# Patient Record
Sex: FEMALE | Race: WHITE
[De-identification: names, ages, dates, MRNs, and addresses within clinical notes are randomized per-mention and may not be internally consistent; named-entity substitution may affect disease eponyms.]

---

## 2017-05-16 ENCOUNTER — HOSPITAL ENCOUNTER (OUTPATIENT)
Dept: HOSPITAL 80 - FSGY | Age: 57
Discharge: HOME HEALTH | End: 2017-05-16
Attending: INTERNAL MEDICINE
Payer: MEDICAID

## 2017-05-16 DIAGNOSIS — K63.5: Primary | ICD-10-CM

## 2017-05-16 DIAGNOSIS — K64.4: ICD-10-CM

## 2017-05-16 DIAGNOSIS — K64.8: ICD-10-CM

## 2017-05-16 NOTE — GPN
[f rep st]



                                                                 PROCEDURE NOTE





PREPROCEDURE DIAGNOSES:  

1.  Heartburn.

2.  Average risk screening colonoscopy.



POSTPROCEDURE DIAGNOSES:  

1.  EGD with biopsy.

2.  Colonoscopy with snare and injection.



ANESTHESIA:  Monitored anesthesia care.



INDICATIONS:  The patient is a 57-year-old female with a history of longstanding heartburn who recen
tly increased her Nexium to 20 mg orally twice per day.  Her symptoms are better controlled with thi
s increased dose of medication.  She has never had a colonoscopy.  She is at average risk for screen
ing colonoscopy.  The risks and the benefits of the procedure were discussed with the patient and co
nsent obtained.  Risks include, but not limited to, bleeding, perforation, and risks associated with
 sedation.  The patient is ASA class 2.



DESCRIPTION OF PROCEDURE:  The end-viewing endoscope was inserted into the esophagus, into the stoma
ch and second portion of the duodenum.  The esophagus appears normal.  The Z-line is irregular and l
ocated at 36 cm from the incisors.  Biopsies were taken using cold biopsy forceps to evaluate for Ba
rrett's.  The stomach appears normal.  Retroflexed views in the stomach were normal.  Biopsies were 
taken of the gastric antrum to evaluate for Helicobacter pylori using cold biopsy forceps.  The duod
enum and second portion were normal.  Biopsies were taken using cold biopsy forceps to evaluate for 
celiac disease.  



The patient was then repositioned and the adult colonoscope was advanced into the terminal ileum, wh
ich appeared normal.  The appendiceal orifice and cecum were normal.  In the ascending colon, just d
istal to the ileocecal valve, there is a 1.3 cm flat polyp.  The lesion was removed in 3 pieces usin
g piecemeal polypectomy technique and hot snare.  The polyp appeared to be completely removed.  Two 
hemoclips were placed over the defect to close the site.  A tattoo was placed at the site to lili th
e piecemeal polypectomy site.  The remaining ascending colon, hepatic flexure, transverse colon, spl
enic flexure, and descending colon were normal.  There was a 4 mm sessile polyp in the sigmoid colon
 removed using cold snare polypectomy.  The rectum was normal.  Retroflexed views showed internal he
morrhoids, grade 1.  External hemorrhoids were found on rectal exam.



IMPRESSION:  

1.  Piecemeal polypectomy of ascending colon polyp, status post hemoclip placement and tattoo.

2.  Small sigmoid colon polyp, status post cold snare polypectomy.

3.  Internal and external hemorrhoids.



RECOMMENDATIONS:  

1.  Discharged home with escort.

2.  Advance diet as tolerated.

3.  Follow up on the final pathology results.  Results available within 10 days.

4.  Repeat colonoscopy with monitored anesthesia at the hospital in 6 months given piecemeal polypec
lyndsay and possibly for argon plasma coagulation. 



Thank you for allowing me to participate in the care of your patient.  Please do not hesitate to beni hills with questions.





Job #:  [id number]

## 2018-01-15 ENCOUNTER — HOSPITAL ENCOUNTER (OUTPATIENT)
Dept: HOSPITAL 80 - FSGY | Age: 58
Discharge: HOME | End: 2018-01-15
Payer: MEDICAID

## 2018-01-15 VITALS — RESPIRATION RATE: 14 BRPM

## 2018-01-15 VITALS — DIASTOLIC BLOOD PRESSURE: 73 MMHG | SYSTOLIC BLOOD PRESSURE: 123 MMHG | HEART RATE: 66 BPM | OXYGEN SATURATION: 96 %

## 2018-01-15 VITALS — TEMPERATURE: 97.9 F

## 2018-01-15 DIAGNOSIS — D12.2: ICD-10-CM

## 2018-01-15 DIAGNOSIS — D12.5: Primary | ICD-10-CM

## 2018-01-15 PROCEDURE — 0DBN8ZX EXCISION OF SIGMOID COLON, VIA NATURAL OR ARTIFICIAL OPENING ENDOSCOPIC, DIAGNOSTIC: ICD-10-PCS

## 2018-01-15 PROCEDURE — 0DBK8ZX EXCISION OF ASCENDING COLON, VIA NATURAL OR ARTIFICIAL OPENING ENDOSCOPIC, DIAGNOSTIC: ICD-10-PCS

## 2018-01-15 NOTE — PDGENHP
History & Physical


Chief Complaint: polyp


History of Present Illness: 57 year old female presents for surveillance of a 

ascending colon polyp.


Pertinent Past, Social, Family History: PMHx: DJD, asthma, reflux


Relevant Physical Exam: HEENT: Anicteric.  CV: RRR +s1s2.  Lungs: CTAB.  Abd; 

soft, nt, + bs


Cardiorespiratory Assessment: ASA 2

## 2018-01-15 NOTE — PDANEPAE
ANE Past Medical History





- Cardiovascular History


Hx Hypertension: No


Hx Arrhythmias: No


Hx Chest Pain: No


Hx Coronary Artery / Peripheral Vascular Disease: No


Hx CHF / Valvular Disease: No


Hx Palpitations: No


Cardiovascular History Comment: HYPERLIPIDEMIA





- Pulmonary History


Hx COPD: No


Hx Asthma/Reactive Airway Disease: Yes


Hx Recent Upper Respiratory Infection: No


Hx Oxygen in Use at Home: No


Hx Sleep Apnea: No


Sleep Apnea Screening Result - Last Documented: Negative


Pulmonary History Comment: SENSITIVE TO SCENTS & SEASONAL





- Neurologic History


Hx Cerebrovascular Accident: No


Hx Seizures: No


Hx Dementia: No





- Endocrine History


Hx Diabetes: No





- Renal History


Hx Renal Disorders: Yes


Renal History Comment: MILD STRESS INCONT.





- Liver History


Hx Hepatic Disorders: No





- Neurological & Psychiatric Hx


Hx Neurological and Psychiatric Disorders: No





- Cancer History


Hx Cancer: No





- Congenital Disorder History


Hx Congenital Disorders: No





- GI History


Hx Gastrointestinal Disorders: Yes


Gastrointestinal History Comment: GERD.  HX OF COLON POLYPS.  INTERMITTENT 

DIARRHEA/FECAL INCONT.





- Other Health History


Other Health History: FELL INJURING RT SHLDR 8/2017.  RESIDUAL ISSUES WITH ROM 

CONT.  TO DO PHYSICAL THERAPY





- Chronic Pain History


Chronic Pain: Yes (RT SHLDR)





- Surgical History


Prior Surgeries: EGD/COLONOSCOPY 5/2016.  R KNEE X6.  R TKA.  HAND R.  

TONSILLECTOMY





ANE Review of Systems


Review of Systems: 








- Exercise capacity


METS (RN): 4 METS





ANE Patient History





- Allergies


Allergies/Adverse Reactions: 








aspirin [Aspirin] Allergy (Verified 12/26/17 14:19)


 NAUSEA


bacitracin [Bacitracin] Allergy (Verified 12/26/17 14:20)


 Rash


codeine Allergy (Verified 04/17/17 14:14)


 GI UPSET








- Home Medications


Home Medications: 








PARoxetine HCL HS 08/17/11 [Last Taken 01/13/18]


Atorvastatin Calcium HS 04/17/17 [Last Taken 01/13/18]


Celebrex BID 04/17/17 [Last Taken 01/14/18 20:00]


Claritin BID 04/17/17 [Last Taken 01/13/18 23:00]


Herbals/Supplements -Info Only DAILY 04/17/17 [Last Taken 2 Weeks Ago ~01/01/18]


Nexium DAILY06 04/17/17 [Last Taken 01/14/18 20:00]


Proair Hfa IH PRN 04/17/17 [Last Taken 2 Months Ago ~11/15/17]








- Smoking Hx


Smoking Status: Former smoker





- Family Anes Hx


Family Hx Anesthesia Complications: NEG





ANE Labs/Vital Signs





- Vital Signs


Blood Pressure: 100/80


Heart Rate: 84


Respiratory Rate: 15


O2 Sat (%): 94


Height: 156.21 cm


Weight: 86.183 kg





ANE Physical Exam





- Airway


Neck exam: FROM


Mallampati Score: Class 1


Mouth exam: normal dental/mouth exam





- Pulmonary


Pulmonary: no respiratory distress, no rales or rhonchi, clear to auscultation





- Cardiovascular


Cardiovascular: regular rate and rhythym, no murmur, rub, or gallop





- ASA Status


ASA Status: III





ANE Anesthesia Plan


Anesthesia Plan: GA with mask

## 2018-01-15 NOTE — GIREPORT
[facility]

Surgical Services - Endoscopy Department

_____________________________________________________________________________________________________
_______

Patient Name: Evelia Fletcher                           Procedure Date: 1/15/2018 1:36 PM

MRN: [id number]                                       Account Number: [id number]

Patient Type: Outpatient                             Attending  MD/ ER Physician: Toni Ayala MD

_____________________________________________________________________________________________________
_______

 

Procedure:                    Colonoscopy

Indications:                  High risk colon cancer surveillance: Personal history of colonic polyps


Patient Profile:              57 year old female presents for surveillance of a complex polyp in the 


                              ascending colon

Providers:                    Toni Ayala MD

Medicines:                    Monitored Anesthesia Care

Complications:                No immediate complications. Estimated blood loss: Minimal.

Description of Procedure:     After obtaining informed consent, the scope was passed under direct vis
ion. 

                              Throughout the procedure, the patient's blood pressure, pulse, and oxyg
en 

                              saturations were monitored continuously. The Colonoscope with irrigatio
n 

                              channel was introduced through the anus and advanced to the cecum, 

                              identified by appendiceal orifice and ileocecal valve. The colonoscopy 
was 

                              performed without difficulty. The patient tolerated the procedure well.
 The 

                              quality of the bowel preparation was good. The ileocecal valve, appendi
ceal 

                              orifice, and rectum were photographed.

Findings:                     The perianal and digital rectal examinations were normal. Pertinent 

                              negatives include no palpable rectal lesions.

                              A 7 mm polyp was found in the sigmoid colon. The polyp was sessile. The
 

                              polyp was removed with a hot snare. Resection and retrieval were comple
te.

                              A 2 mm polyp was found in the ascending colon. The polyp was sessile. T
he 

                              polyp was removed with a cold biopsy forceps. Resection and retrieval w
ere 

                              complete.

                              A 25 mm polyp was found in the ascending colon. The polyp was sessile. 
The 

                              polyp was removed with a saline injection-lift technique using a hot sn
are. 

                              The polyp was removed with a piecemeal technique using a hot snare. 

                              Resection and retrieval were complete.

Estimated Blood Loss:         Estimated blood loss was minimal.

Post Op Diagnosis:            - One 7 mm polyp in the sigmoid colon, removed with a hot snare. Resect
ed 

                              and retrieved.

                              - One 2 mm polyp in the ascending colon, removed with a cold biopsy for
ceps. 

                              Resected and retrieved.

                              - One subtle 25 mm polyp in the ascending colon, removed using 

                              injection-lift and a hot snare and removed piecemeal using a hot snare.
 

                              Resected and retrieved.

                              - The prior polypectomy site was seen and no remnant polyp was visualiz
ed.

Recommendation:               - Discharge patient to home (with escort).

                              - Resume previous diet.

                              - Continue present medications.

                              - Repeat colonoscopy in 3 months for surveillance.

                              - Use fiber, for example Citrucel, Fibercon, Konsyl or Metamucil.

                              - Thank you for allowing me to participate in the care of your patient.


Attending Participation:

     I personally performed the entire procedure.

 

Toni Ayala MD

_____________

Toni Ayala MD

1/15/2018 2:45:28 PM

This report has been signed electronicallyToni Ayala MD

Number of Addenda: 0

 

Note Initiated On: 1/15/2018 1:36 PM

Total Procedure Duration Time 0 hours 20 minutes 14 seconds 

[email or web address]

## 2019-01-14 ENCOUNTER — HOSPITAL ENCOUNTER (OUTPATIENT)
Dept: HOSPITAL 80 - FIMAGING | Age: 59
End: 2019-01-14
Attending: PSYCHIATRY & NEUROLOGY
Payer: MEDICAID

## 2019-01-14 DIAGNOSIS — M12.88: ICD-10-CM

## 2019-01-14 DIAGNOSIS — M48.02: Primary | ICD-10-CM

## 2019-01-14 DIAGNOSIS — M99.71: ICD-10-CM

## 2019-01-14 DIAGNOSIS — M43.12: ICD-10-CM

## 2019-01-14 DIAGNOSIS — M50.322: ICD-10-CM

## 2019-01-14 DIAGNOSIS — M99.72: ICD-10-CM

## 2019-01-14 DIAGNOSIS — R94.02: ICD-10-CM

## 2019-01-14 PROCEDURE — A9585 GADOBUTROL INJECTION: HCPCS

## 2019-01-16 ENCOUNTER — HOSPITAL ENCOUNTER (OUTPATIENT)
Dept: HOSPITAL 80 - CIMAGING | Age: 59
End: 2019-01-16
Attending: INTERNAL MEDICINE
Payer: MEDICAID

## 2019-01-16 DIAGNOSIS — R06.00: Primary | ICD-10-CM

## 2019-01-16 DIAGNOSIS — R05: ICD-10-CM

## 2019-02-17 ENCOUNTER — HOSPITAL ENCOUNTER (OUTPATIENT)
Dept: HOSPITAL 80 - FCPNEURO | Age: 59
End: 2019-02-17
Attending: PSYCHIATRY & NEUROLOGY
Payer: MEDICAID

## 2019-02-17 DIAGNOSIS — G47.33: Primary | ICD-10-CM

## 2019-02-17 DIAGNOSIS — G47.31: ICD-10-CM

## 2019-02-17 DIAGNOSIS — G47.34: ICD-10-CM

## 2019-06-12 ENCOUNTER — HOSPITAL ENCOUNTER (EMERGENCY)
Dept: HOSPITAL 80 - CED | Age: 59
Discharge: HOME | End: 2019-06-12
Payer: MEDICAID